# Patient Record
(demographics unavailable — no encounter records)

---

## 2024-12-13 NOTE — ASSESSMENT
[FreeTextEntry1] : -Findings were reviewed and discussed with the patient in detail -Good aural hygiene reviewed -Patient may use wax removal drops as needed No evidence of pathological lymphadenopathy at this time.  We discussed the pathophysiology of reactive lymph nodes. -I will see the patient in follow up prn.

## 2024-12-13 NOTE — PHYSICAL EXAM
[TextEntry] :   PHYSICAL EXAM   General: The patient was alert and oriented and in no distress. Voice was clear.   Eyes: The patient was alert, oriented and in no distress. Voice was clear.   Eyes: Ocular motility normal. No proptosis. Conjunctiva normal. Sclera white. There was no significant nystagmus or disconjugate gaze noted.   Face: The patient had no facial asymmetry or mass. The skin was unremarkable.   Nose: The external nose had no significant deformity.  There was no facial tenderness.  On anterior rhinoscopy, the nasal mucosa was healthy in appearance. The anterior septum was midline.  There were no visualized polyps purulence  or masses.   Oral cavity: Oral mucosa- normal. Oral and base of tongue- clear and without mass. Gingival and buccal mucosa- moist and without lesions. Palate- the palate moved well. There was no cleft palate. There appeared to be good salivary flow.  Oral cavity/oropharynx- no pus, erythema or mass.   Neck: The neck was symmetrical. The parotid and submandibular glands were normal without masses. The trachea was midline and there was no unusual crepitus. Thyroid was smooth and nontender and no masses were palpated. Cervical adenopathy- none.     PROCEDURE: Microscopic ear exam INDICATIONS:  Hearing loss. Cerumen impaction Left: Pinna normal,  SIGNIFICANT CERUMEN IMPACTION REMOVED USING OTOLOGIC INSTRUMENTS (loop, suction and alligator), EAC and tympanic membrane within normal limits. No evidence of fluid in the middle ear space. Right: Pinna normal,  SIGNIFICANT CERUMEN IMPACTION REMOVED USING OTOLOGIC INSTRUMENTS (loop, suction and alligator), EAC and tympanic membrane within normal limits. No evidence of fluid in the middle ear space

## 2025-02-03 NOTE — HISTORY OF PRESENT ILLNESS
[de-identified] : Feels as if her ears are clogged.  Initial visit.  Also notes that twice a year she gets a sore throat and what sounds like a swollen lymph node requires antibiotics and oral steroids.  03-Feb-2025 07:22